# Patient Record
Sex: MALE | NOT HISPANIC OR LATINO | URBAN - METROPOLITAN AREA
[De-identification: names, ages, dates, MRNs, and addresses within clinical notes are randomized per-mention and may not be internally consistent; named-entity substitution may affect disease eponyms.]

---

## 2021-12-07 ENCOUNTER — EMERGENCY (EMERGENCY)
Facility: HOSPITAL | Age: 81
LOS: 1 days | Discharge: HOME | End: 2021-12-07
Admitting: EMERGENCY MEDICINE
Payer: MEDICARE

## 2021-12-07 VITALS
TEMPERATURE: 98 F | DIASTOLIC BLOOD PRESSURE: 77 MMHG | HEART RATE: 84 BPM | RESPIRATION RATE: 16 BRPM | HEIGHT: 71 IN | SYSTOLIC BLOOD PRESSURE: 138 MMHG | WEIGHT: 169.98 LBS | OXYGEN SATURATION: 97 %
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
## 2021-12-07 DIAGNOSIS — Z23 ENCOUNTER FOR IMMUNIZATION: ICD-10-CM

## 2021-12-07 DIAGNOSIS — S01.511A LACERATION WITHOUT FOREIGN BODY OF LIP, INITIAL ENCOUNTER: ICD-10-CM

## 2021-12-07 PROCEDURE — 99283 EMERGENCY DEPT VISIT LOW MDM: CPT

## 2021-12-07 NOTE — ED ADULT NURSE NOTE - CHIEF COMPLAINT QUOTE
[place] from [place], [facility] met up with some old friends today had a few glasses of wine, got up to go to toilet and fell over and hit face on nightstand,  denies loc, sustained laceration above top lip , denies dizzines, gcs 15 not on blood thinners

## 2021-12-07 NOTE — ED ADULT NURSE NOTE - OBJECTIVE STATEMENT
81y male presents w/ laceration to lip. Stable, not bleeding. Pt is in [place] visiting a couple of friends, drank some wine tonight. Went to bed and got up to use the bathroom, had a mechanical trip and fall over his night stand, pt fell on his face. Negative LOC. Not on any blood thinners. Doesn't recall last tetanus vaccine. No head trauma noted.

## 2021-12-07 NOTE — ED ADULT TRIAGE NOTE - CHIEF COMPLAINT QUOTE
[place] [facility] met up with some old friends today had a few glasses of wine, got up to go to toilet and fell over and hit face on nightstand,  denies loc, sustained laceration above top lip , denies dizzines, gcs 15 not on blood thinners

## 2021-12-08 VITALS
DIASTOLIC BLOOD PRESSURE: 78 MMHG | OXYGEN SATURATION: 97 % | TEMPERATURE: 98 F | RESPIRATION RATE: 16 BRPM | SYSTOLIC BLOOD PRESSURE: 169 MMHG | HEART RATE: 84 BPM

## 2021-12-08 RX ORDER — TETANUS TOXOID, REDUCED DIPHTHERIA TOXOID AND ACELLULAR PERTUSSIS VACCINE, ADSORBED 5; 2.5; 8; 8; 2.5 [IU]/.5ML; [IU]/.5ML; UG/.5ML; UG/.5ML; UG/.5ML
0.5 SUSPENSION INTRAMUSCULAR ONCE
Refills: 0 | Status: COMPLETED | OUTPATIENT
Start: 2021-12-08 | End: 2021-12-08

## 2021-12-08 RX ORDER — CEFAZOLIN SODIUM 1 G
1000 VIAL (EA) INJECTION ONCE
Refills: 0 | Status: DISCONTINUED | OUTPATIENT
Start: 2021-12-08 | End: 2021-12-08

## 2021-12-08 RX ADMIN — Medication 1 TABLET(S): at 03:00

## 2021-12-08 RX ADMIN — TETANUS TOXOID, REDUCED DIPHTHERIA TOXOID AND ACELLULAR PERTUSSIS VACCINE, ADSORBED 0.5 MILLILITER(S): 5; 2.5; 8; 8; 2.5 SUSPENSION INTRAMUSCULAR at 03:00

## 2021-12-08 NOTE — ED PROVIDER NOTE - CLINICAL SUMMARY MEDICAL DECISION MAKING FREE TEXT BOX
pt presents with lip laceration after mechanical trip and fall. recommended CTH but pt refused. no neuro symptoms. understands he could have an occult bleed and accepts that risk. does not want imaging. requesting lac repair and discharge. lac repaired by Dr. Bradford (plastics). abx given. will d/c.

## 2021-12-08 NOTE — ED PROVIDER NOTE - NSFOLLOWUPINSTRUCTIONS_ED_ALL_ED_FT
Laceration    A laceration is a cut that goes through all of the layers of the skin and into the tissue that is right under the skin. Some lacerations heal on their own. Others need to be closed with skin adhesive strips, skin glue, stitches (sutures), or staples. Proper laceration care minimizes the risk of infection and helps the laceration to heal better.  If non-absorbable stitches or staples have been placed, they must be taken out within the time frame instructed by your healthcare provider.    SEEK IMMEDIATE MEDICAL CARE IF YOU HAVE ANY OF THE FOLLOWING SYMPTOMS: swelling around the wound, worsening pain, drainage from the wound, red streaking going away from your wound, inability to move finger or toe near the laceration, or discoloration of skin near the laceration.    WOUND CARE AS INSTRUCTED BY DR. MYERS. TAKE ANTIBIOTICS AS PRESCRIBED TO PREVENT INFECTION. THESE TYPES OF LACERATIONS ARE PRONE TO INFECTION.     THE AUGMENTIN CAN SOMETIMES CAUSE SOME DIARRHEA AS A SIDE EFFECT BUT TRY TO CONTINUE THE COURSE.

## 2021-12-08 NOTE — ED PROVIDER NOTE - OBJECTIVE STATEMENT
82yo generally healthy M presents with c/o lip laceration 2/2 mechanical trip and fall. pt notes he was had a few glasses of wine and then got out of bed in the dark to go to the bathroom, and tripped, striking his face on the nightstand. pt takes no anticoagulation. denies any nausea, vomiting, headache, dizziness, numbness, tingling, weakness, neck or back pain, extremity pain, vision changes, any other concerns. pt advised to receive a CTH but he declined. understands the risk of intracranial bleeding but declines.

## 2021-12-08 NOTE — ED PROVIDER NOTE - MUSCULOSKELETAL, MLM
Spine appears normal, range of motion is not limited, no muscle or joint tenderness. ambulating with normal steady gait.

## 2021-12-08 NOTE — ED PROVIDER NOTE - ENMT, MLM
Airway patent, Nasal mucosa clear. Mouth with normal mucosa. Throat has no vesicles, no oropharyngeal exudates and uvula is midline. + left upper lip with 1.5cm laceration extending through the vermillion border and through the lip to the inner lip 1cm. no obvious dental fractures.

## 2021-12-08 NOTE — ED PROVIDER NOTE - PATIENT PORTAL LINK FT
You can access the FollowMyHealth Patient Portal offered by [facility] by registering at the following website: [email or web address]. By joining [employer]’s FollowMyHealth portal, you will also be able to view your health information using other applications (apps) compatible with our system.